# Patient Record
Sex: FEMALE | Race: WHITE | URBAN - METROPOLITAN AREA
[De-identification: names, ages, dates, MRNs, and addresses within clinical notes are randomized per-mention and may not be internally consistent; named-entity substitution may affect disease eponyms.]

---

## 2020-10-02 ENCOUNTER — OFFICE VISIT (OUTPATIENT)
Dept: ORTHOPEDIC SURGERY | Age: 25
End: 2020-10-02
Payer: COMMERCIAL

## 2020-10-02 VITALS — BODY MASS INDEX: 23.04 KG/M2 | HEIGHT: 63 IN | WEIGHT: 130 LBS

## 2020-10-02 PROCEDURE — 99203 OFFICE O/P NEW LOW 30 MIN: CPT | Performed by: ORTHOPAEDIC SURGERY

## 2020-10-02 RX ORDER — DICLOFENAC SODIUM 75 MG/1
75 TABLET, DELAYED RELEASE ORAL 2 TIMES DAILY
Qty: 28 TABLET | Refills: 0 | Status: SHIPPED | OUTPATIENT
Start: 2020-10-02 | End: 2020-10-16

## 2020-10-02 NOTE — PROGRESS NOTES
CHIEF COMPLAINT:    Chief Complaint   Patient presents with    Knee Pain     RIGHT KNEE PAIN, HAS BEEN WORKING OUT RECENTLY WITH A  DOING LE/SINGLE LEG EXERCISES. HISTORY OF PRESENT ILLNESS:                The patient is a 26 y.o. female this patient presents today for orthopedic evaluation of her aright knee. She has been working out with a  over the last few weeks and doing quite a bit of patellar intensive activities particularly single-leg lunges. She started to notice some soreness in her knee, the anterior lateral patellar facet. She states that even walking up and down stairs now is painful for her. She has backed off of activity with a  and her symptoms have improved. She points to the anterior lateral patellofemoral compartment as the source of her pain. No numbness or tingling, no known injury or trauma. Past Medical History:   Diagnosis Date    Anxiety     Hematuria     Kidney stones         Work Status: She works for [employer]    The pain assessment was noted & is as follows:  Pain Assessment  Location of Pain: Knee  Location Modifiers: Right  Severity of Pain: 5  Quality of Pain: Aching  Duration of Pain: Persistent  Frequency of Pain: Constant]      Work Status/Functionality:     Past Medical History: Medical history form was reviewed today & can be found in the media tab  Past Medical History:   Diagnosis Date    Anxiety     Hematuria     Kidney stones       Past Surgical History:     Past Surgical History:   Procedure Laterality Date    CYSTOSCOPY  12/23/2010    Video Cystoscopy Bilateral Retrograde     Current Medications:     Current Outpatient Medications:     NONFORMULARY, Birth controll, Disp: , Rfl:     ondansetron (ZOFRAN) 4 MG tablet, Take 1 tablet by mouth every 6 hours as needed for Nausea for 20 doses. , Disp: 20 tablet, Rfl: 0    Lisdexamfetamine Dimesylate (VYVANSE) 40 MG CAPS, Take 1 tablet by mouth daily. , Disp: , Rfl: Allergies:  Pcn [penicillins] and Amoxicillin  Social History:    reports that she has never smoked. She has never used smokeless tobacco. She reports that she does not drink alcohol or use drugs. Family History:   No family history on file. REVIEW OF SYSTEMS:   For new problems, a full review of systems will be found scanned in the patient's chart. CONSTITUTIONAL: Denies unexplained weight loss, fevers, chills   NEUROLOGICAL: Denies unsteady gait or progressive weakness  SKIN: Denies skin changes, delayed healing, rash, itching       PHYSICAL EXAM:    Vitals: Height 5' 3\" (1.6 m), weight 130 lb (59 kg). GENERAL EXAM:  · General Apparence: Patient is adequately groomed with no evidence of malnutrition. · Orientation: The patient is oriented to time, place and person. · Mood & Affect:The patient's mood and affect are appropriate       Right knee PHYSICAL EXAMINATION:  · Inspection: Slight lateral tracking of the patella is visualized, hypotrophic quadricep structures    · Palpation: Tender focally at the anterior lateral patellar facet, mild tenderness to the medial patellar facet as well, no joint line tenderness    · Range of Motion: 0 to 125 degrees    · Strength: The extensor mechanism is intact    · Special Tests: ACL PCL MCL and LCL feel stable          · Skin:  There are no rashes, ulcerations or lesions. · There are no dysvascular changes     Gait & station: Within normal limits today      Additional Examinations:        Left Lower Extremity: Examination of the left lower extremity does not show any tenderness, deformity or injury. Range of motion is unremarkable. There is no gross instability. There are no rashes, ulcerations or lesions. Strength and tone are normal.      Diagnostic Testing:   The following x rays were read and interpreted by myself      1.  3 x-ray views of the right knee demonstrate slightly dysmorphic patella but no evidence of acute abnormality    Orders     Orders Placed This Encounter   Procedures    XR KNEE RIGHT (3 VIEWS)     Standing Status:   Future     Number of Occurrences:   1     Standing Expiration Date:   11/2/2020         Assessment / Treatment Plan:     1. Right knee patellofemoral stress syndrome    The patient has been doing quite a bit of patellar intensive activities the last few weeks with her . We talked about the importance of backing off patellar intensive activities. She is can work on low-impact VMO strengthening ice and elevation. I have called her in a two-week supply of Voltaren with GI precautions. Follow-up in 3 weeks PRN    I have personally performed and/or participated in the history, exam and medical decision making and agree with all pertinent clinical information. I have also reviewed and agree with the past medical, family and social history unless otherwise noted. This dictation was performed with a verbal recognition program (DRAGON) and it was checked for errors. It is possible that there are still dictated errors within this office note. If so, please bring any errors to my attention for an addendum. All efforts were made to ensure that this office note is accurate.           Frances Hernandez MD